# Patient Record
Sex: FEMALE | Race: WHITE | NOT HISPANIC OR LATINO | URBAN - METROPOLITAN AREA
[De-identification: names, ages, dates, MRNs, and addresses within clinical notes are randomized per-mention and may not be internally consistent; named-entity substitution may affect disease eponyms.]

---

## 2024-03-04 ENCOUNTER — OFFICE (OUTPATIENT)
Dept: URBAN - METROPOLITAN AREA CLINIC 18 | Facility: CLINIC | Age: 64
End: 2024-03-04

## 2024-03-04 VITALS
HEART RATE: 95 BPM | HEIGHT: 66 IN | OXYGEN SATURATION: 95 % | DIASTOLIC BLOOD PRESSURE: 62 MMHG | WEIGHT: 263 LBS | SYSTOLIC BLOOD PRESSURE: 100 MMHG

## 2024-03-04 DIAGNOSIS — K76.0 FATTY (CHANGE OF) LIVER, NOT ELSEWHERE CLASSIFIED: ICD-10-CM

## 2024-03-04 DIAGNOSIS — Z79.01 LONG TERM (CURRENT) USE OF ANTICOAGULANTS: ICD-10-CM

## 2024-03-04 DIAGNOSIS — Z86.711 PERSONAL HISTORY OF PULMONARY EMBOLISM: ICD-10-CM

## 2024-03-04 DIAGNOSIS — K59.00 CONSTIPATION, UNSPECIFIED: ICD-10-CM

## 2024-03-04 DIAGNOSIS — K21.9 GASTRO-ESOPHAGEAL REFLUX DISEASE WITHOUT ESOPHAGITIS: ICD-10-CM

## 2024-03-04 PROCEDURE — 99204 OFFICE O/P NEW MOD 45 MIN: CPT | Performed by: INTERNAL MEDICINE

## 2024-03-04 NOTE — SERVICEHPINOTES
Nargis Urias   is seen today for aConsultation at the request of Dr. Jaime Rios.    G gastro/NexGen/referral data is reviewed prior to office visit/consultation. Insert past history patient states had colonoscopy [facility] 2018. I see no evidence of it on reviewing her chart previously. There is no history of GERD, dysphagia, odynophagia, epigastric pain or nausea vomiting. She has a longstanding history of constipation. She used to take Linzess but now it is cost prohibitive. Every 3 or 4-day bowel movement. We discussed taking daily MiraLAX with Metamucil. Note made she is on 100 mg of amitriptyline today for migraine prophylaxis. She continues on warfarin for her history of pulmonary embolism.She had a history of delayed gastric emptying remotely though she denies any nausea currently.She does have chronic GERD is controlled on twice a day PPI therapy.There is no family history of colon polyps, gastric cancer or colon cancer in the patient's family.Back in 2016 she had a biopsy of the small bowel suggestive of celiac disease. She is not avoiding gluten currently.

## 2024-03-04 NOTE — SERVICENOTES
Start taking either 1 or 2 Senokot twice a day this is available generically at your favorite drugstore.  Alternatively you could take 1 cap of MiraLAX with 1 tablespoon of Metamucil in liquid daily

## 2024-03-21 ENCOUNTER — OFFICE (OUTPATIENT)
Dept: URBAN - METROPOLITAN AREA CLINIC 18 | Facility: CLINIC | Age: 64
End: 2024-03-21

## 2024-03-21 VITALS — HEIGHT: 66 IN

## 2024-03-21 DIAGNOSIS — K76.0 FATTY (CHANGE OF) LIVER, NOT ELSEWHERE CLASSIFIED: ICD-10-CM

## 2024-03-21 PROCEDURE — 76981 USE PARENCHYMA: CPT | Performed by: INTERNAL MEDICINE

## 2024-04-18 ENCOUNTER — OFFICE (OUTPATIENT)
Dept: URBAN - METROPOLITAN AREA CLINIC 13 | Facility: CLINIC | Age: 64
End: 2024-04-18

## 2024-04-18 VITALS
OXYGEN SATURATION: 90 % | WEIGHT: 271 LBS | DIASTOLIC BLOOD PRESSURE: 80 MMHG | HEART RATE: 79 BPM | HEIGHT: 66 IN | SYSTOLIC BLOOD PRESSURE: 130 MMHG

## 2024-04-18 DIAGNOSIS — K21.9 GASTRO-ESOPHAGEAL REFLUX DISEASE WITHOUT ESOPHAGITIS: ICD-10-CM

## 2024-04-18 DIAGNOSIS — Z79.01 LONG TERM (CURRENT) USE OF ANTICOAGULANTS: ICD-10-CM

## 2024-04-18 DIAGNOSIS — K76.0 FATTY (CHANGE OF) LIVER, NOT ELSEWHERE CLASSIFIED: ICD-10-CM

## 2024-04-18 DIAGNOSIS — K59.09 OTHER CONSTIPATION: ICD-10-CM

## 2024-04-18 DIAGNOSIS — Z86.711 PERSONAL HISTORY OF PULMONARY EMBOLISM: ICD-10-CM

## 2024-04-18 DIAGNOSIS — E66.01 MORBID (SEVERE) OBESITY DUE TO EXCESS CALORIES: ICD-10-CM

## 2024-04-18 PROCEDURE — 99214 OFFICE O/P EST MOD 30 MIN: CPT | Performed by: INTERNAL MEDICINE

## 2024-04-18 NOTE — SERVICEHPINOTES
Nargis Urias   is seen today for a follow-up visit.     Insert NexGen insert past history we discussed her FibroScan F1 S3. Discussed Need to Lose Weight. CBC CMP and Celiac Markers RecentlyIn the Setting of Low Gluten Avoidance.It is not clear what the villous blunting was from on the study in 2016 but clearly would not be celiac disease.GERD is controlled on PPI.I had given her Linzess samples for her constipation and these worked well for her. Unfortunately the medicine is cost prohibitive i.e. $500 a month.

## 2024-04-18 NOTE — SERVICENOTES
Go up to 2 caps of MiraLAX a day.  With this, try taking 1 Senokot twice a dayYou can go up on the MiraLAX and the Senokot dosing as you see fit.

## 2024-06-28 ENCOUNTER — ON CAMPUS - OUTPATIENT (OUTPATIENT)
Dept: URBAN - METROPOLITAN AREA HOSPITAL 105 | Facility: HOSPITAL | Age: 64
End: 2024-06-28

## 2024-06-28 DIAGNOSIS — K59.09 OTHER CONSTIPATION: ICD-10-CM

## 2024-06-28 DIAGNOSIS — K63.5 POLYP OF COLON: ICD-10-CM

## 2024-06-28 DIAGNOSIS — K63.89 OTHER SPECIFIED DISEASES OF INTESTINE: ICD-10-CM

## 2024-06-28 PROCEDURE — 45380 COLONOSCOPY AND BIOPSY: CPT | Performed by: INTERNAL MEDICINE

## 2025-07-21 ENCOUNTER — OFFICE (OUTPATIENT)
Dept: URBAN - METROPOLITAN AREA CLINIC 13 | Facility: CLINIC | Age: 65
End: 2025-07-21
Payer: MEDICARE

## 2025-07-21 VITALS
DIASTOLIC BLOOD PRESSURE: 80 MMHG | SYSTOLIC BLOOD PRESSURE: 130 MMHG | WEIGHT: 193 LBS | OXYGEN SATURATION: 97 % | HEIGHT: 66 IN | HEART RATE: 81 BPM

## 2025-07-21 DIAGNOSIS — K59.00 CONSTIPATION, UNSPECIFIED: ICD-10-CM

## 2025-07-21 DIAGNOSIS — D68.59 OTHER PRIMARY THROMBOPHILIA: ICD-10-CM

## 2025-07-21 DIAGNOSIS — K21.9 GASTRO-ESOPHAGEAL REFLUX DISEASE WITHOUT ESOPHAGITIS: ICD-10-CM

## 2025-07-21 DIAGNOSIS — Z98.84 BARIATRIC SURGERY STATUS: ICD-10-CM

## 2025-07-21 DIAGNOSIS — E66.9 OBESITY, UNSPECIFIED: ICD-10-CM

## 2025-07-21 DIAGNOSIS — K76.0 FATTY (CHANGE OF) LIVER, NOT ELSEWHERE CLASSIFIED: ICD-10-CM

## 2025-07-21 DIAGNOSIS — Z86.711 PERSONAL HISTORY OF PULMONARY EMBOLISM: ICD-10-CM

## 2025-07-21 PROCEDURE — 99214 OFFICE O/P EST MOD 30 MIN: CPT

## 2025-07-21 RX ORDER — PANTOPRAZOLE SODIUM 40 MG/1
TABLET, DELAYED RELEASE ORAL
Qty: 90 | Refills: 3 | Status: ACTIVE

## 2025-07-21 NOTE — SERVICEHPINOTES
Nargis Urias   is seen today for a follow-up visit.    brThe patient, with a history of gastric surgery, presents with chronic constipation for follow-up.Chronic constipation has worsened since her gastric surgery in October. Linzess and Trulance were effective but unaffordable. Colace and a double dose of Miralax have not provided significant relief. Difficulty consuming large volumes of liquid affects her ability to take Miralax effectively. Her last bowel movement was on Friday, and she is concerned about becoming severely constipated again, which she describes as 'catastrophic'.She has a history of peritonitis and significant scar tissue from exploratory surgery in her youth, which she believes contributes to her constipation. 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 She has attempted dietary modifications, including consuming fruits like oranges and melons, and salads, but struggles to eat full meals. She has lost a significant amount of weight since October, dropping from 273 pounds to 193 pounds.  She has a history of pulmonary embolism and tachycardia, which developed after receiving the COVID vaccine. She is currently on blood thinners and is working with a cardiologist to manage her heart rate. She also has a history of fatty liver and is taking Protonix for gastrointestinal issues.Nausea related to her gastric bypass surgery affects her ability to eat. She describes a sensation called 'the foamies' that precedes vomiting. No recent rectal bleeding, and she tries to maintain stool consistency to avoid complications.
br
Dulce is having a Diagnostic laparoscopy with intraoperative EGD on 8/19/25 because of the increase in abdominal pain that she has been having
br
10/30/24-pt developed a clot distal to her JJ anastomosis which caused a significant small bowel obstruction br
br    6/19/25-labs completed-AST-15, ALT-15, Alk phos-106
br
br6/28/24- colonoscopy-poor prep. Melanosis coli. 3 hyperplastic polyps removed from the sigmoid colon.7/22/2464-PDA-Omurhtlz to the mucosa of the stomach consistent with gastritis. H. pylori negative no hiatal hernia noted3/21/58-[id number]-V3-1, S3 Moderate to severe steatosisbr 
br